# Patient Record
Sex: FEMALE | Race: OTHER | Employment: FULL TIME | URBAN - METROPOLITAN AREA
[De-identification: names, ages, dates, MRNs, and addresses within clinical notes are randomized per-mention and may not be internally consistent; named-entity substitution may affect disease eponyms.]

---

## 2023-12-28 ENCOUNTER — OFFICE VISIT (OUTPATIENT)
Dept: PRIMARY CARE CLINIC | Age: 33
End: 2023-12-28
Payer: COMMERCIAL

## 2023-12-28 VITALS
WEIGHT: 139.8 LBS | DIASTOLIC BLOOD PRESSURE: 74 MMHG | SYSTOLIC BLOOD PRESSURE: 105 MMHG | OXYGEN SATURATION: 97 % | HEART RATE: 85 BPM

## 2023-12-28 DIAGNOSIS — M62.838 TRAPEZIUS MUSCLE SPASM: Primary | ICD-10-CM

## 2023-12-28 DIAGNOSIS — R20.2 PARESTHESIA OF RIGHT ARM: ICD-10-CM

## 2023-12-28 PROCEDURE — 99203 OFFICE O/P NEW LOW 30 MIN: CPT | Performed by: FAMILY MEDICINE

## 2023-12-28 RX ORDER — METHYLPREDNISOLONE 4 MG/1
TABLET ORAL
Qty: 1 KIT | Refills: 0 | Status: SHIPPED | OUTPATIENT
Start: 2023-12-28

## 2023-12-28 RX ORDER — NORETHINDRONE ACETATE AND ETHINYL ESTRADIOL .02; 1 MG/1; MG/1
TABLET ORAL
COMMUNITY
Start: 2023-08-23

## 2023-12-28 RX ORDER — MELOXICAM 7.5 MG/1
7.5 TABLET ORAL DAILY
Qty: 30 TABLET | Refills: 0 | Status: SHIPPED | OUTPATIENT
Start: 2023-12-28

## 2023-12-28 NOTE — PROGRESS NOTES
[facility]. PRIMARY CARE  [address]  [address] [place] [id number]  Dept: [phone]  Dept Fax: [phone]     2023      Iain Morales   1990     Chief Complaint   Patient presents with    Establish Care    Arm Pain     No injury started about 2 months ago right arm        HPI    Pt comes in today for new patient visit. C/o right arm pain. Started ~ 2 months ago. Pain is mostly upper arm. Has noticed some weakness in her  strength. Arm feels heavy if she is at rest with tingling from the elbow down into her hand. She is right hand dominant. No specific injury but does  her daughter on this side who just turned 1. Pain is worse with lifting the arm up. PHQ-9 Total Score: 0 (2023  9:01 AM)       Prior to Visit Medications    Medication Sig Taking? Authorizing Provider   norethindrone-ethinyl estradiol (MICROGESTIN ) 1-20 MG-MCG per tablet Take 1 active tablet by oral route daily, takes continuously, skips placebos Yes Provider, Historical, MD        History reviewed. No pertinent past medical history. Social History     Tobacco Use    Smoking status: Never    Smokeless tobacco: Never   Substance Use Topics    Alcohol use: Yes     Comment: socially    Drug use: Never        Past Surgical History:   Procedure Laterality Date     SECTION, CLASSIC          No Known Allergies     Family History   Problem Relation Age of Onset    Thyroid Disease Mother     Unknown Father     Cancer Maternal Aunt     Hypertension Maternal Grandmother     Diabetes Maternal Grandfather         Patient's past medical history, surgical history, family history, medications, and allergies  were all reviewed and updated as appropriate today. Review of Systems   Constitutional:  Negative for fever. Respiratory:  Negative for cough. Musculoskeletal:  Negative for joint swelling, myalgias, neck pain and neck stiffness.        /74   Pulse 85   Wt

## 2024-03-06 ENCOUNTER — PATIENT MESSAGE (OUTPATIENT)
Dept: PRIMARY CARE CLINIC | Age: 34
End: 2024-03-06

## 2024-03-06 NOTE — TELEPHONE ENCOUNTER
From: Kristin Elkins  To: Dr. Leonor Smith  Sent: 3/6/2024 10:27 AM EST  Subject: Continued pain    Hi Dr. Smith, I have had continued pain that has now moved from my shoulder/upper arm to my forearm and wrist. I suspect it may be forearm tendonitis. I also a have a small lump sticking out of my wrist and hurts if i put a tiny bit of pressure on it.     I would like to get a referral to physical therapy and something than can assist with pain but would prefer it is not any type of pain killer.

## 2024-03-07 ENCOUNTER — OFFICE VISIT (OUTPATIENT)
Dept: PRIMARY CARE CLINIC | Age: 34
End: 2024-03-07
Payer: COMMERCIAL

## 2024-03-07 VITALS
DIASTOLIC BLOOD PRESSURE: 58 MMHG | SYSTOLIC BLOOD PRESSURE: 89 MMHG | WEIGHT: 139 LBS | OXYGEN SATURATION: 99 % | HEART RATE: 68 BPM | TEMPERATURE: 98.5 F

## 2024-03-07 DIAGNOSIS — R20.2 PARESTHESIA OF RIGHT ARM: ICD-10-CM

## 2024-03-07 DIAGNOSIS — M79.631 RIGHT FOREARM PAIN: Primary | ICD-10-CM

## 2024-03-07 PROCEDURE — 99213 OFFICE O/P EST LOW 20 MIN: CPT | Performed by: FAMILY MEDICINE

## 2024-03-07 SDOH — ECONOMIC STABILITY: FOOD INSECURITY: WITHIN THE PAST 12 MONTHS, THE FOOD YOU BOUGHT JUST DIDN'T LAST AND YOU DIDN'T HAVE MONEY TO GET MORE.: NEVER TRUE

## 2024-03-07 SDOH — ECONOMIC STABILITY: FOOD INSECURITY: WITHIN THE PAST 12 MONTHS, YOU WORRIED THAT YOUR FOOD WOULD RUN OUT BEFORE YOU GOT MONEY TO BUY MORE.: NEVER TRUE

## 2024-03-07 SDOH — ECONOMIC STABILITY: INCOME INSECURITY: HOW HARD IS IT FOR YOU TO PAY FOR THE VERY BASICS LIKE FOOD, HOUSING, MEDICAL CARE, AND HEATING?: NOT HARD AT ALL

## 2024-03-07 SDOH — ECONOMIC STABILITY: HOUSING INSECURITY
IN THE LAST 12 MONTHS, WAS THERE A TIME WHEN YOU DID NOT HAVE A STEADY PLACE TO SLEEP OR SLEPT IN A SHELTER (INCLUDING NOW)?: NO

## 2024-03-07 ASSESSMENT — PATIENT HEALTH QUESTIONNAIRE - PHQ9
SUM OF ALL RESPONSES TO PHQ QUESTIONS 1-9: 0
SUM OF ALL RESPONSES TO PHQ9 QUESTIONS 1 & 2: 0
2. FEELING DOWN, DEPRESSED OR HOPELESS: 0
SUM OF ALL RESPONSES TO PHQ QUESTIONS 1-9: 0
1. LITTLE INTEREST OR PLEASURE IN DOING THINGS: 0

## 2024-03-07 NOTE — PROGRESS NOTES
[facility]  [facility] PRIMARY CARE  [address] [place] [id number]  Dept: [phone]  Dept Fax: [phone]     3/7/2024      Kristin Elkins   1990     Chief Complaint   Patient presents with    Joint Pain       HPI    Pt comes in today for persistent RUE pain. Initially seen 23 for right trapezius and upper arm pain. + muscle spasm. Recommended to do meloxicam + medrol and stretching exercises. She did not  the medication. Notes now the pain has migrated to her forearm and wrist. 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 Wrist extension is particularly painful. Has also noticed small nodule to the dorsum on her wrist that is new. Feels  strength has lessened. Hard to pick things up on this side. Occasionally will have numbness/tingling to the arm and hand, not sure what fingers specifically or if all of them. No injury or trauma.     PHQ-9 Total Score: 0 (3/7/2024  8:01 AM)       Prior to Visit Medications    Medication Sig Taking? Authorizing Provider   norethindrone-ethinyl estradiol (MICROGESTIN ) 1-20 MG-MCG per tablet Take 1 active tablet by oral route daily, takes continuously, skips placebos  Provider, MD Sean   methylPREDNISolone (MEDROL DOSEPACK) 4 MG tablet Take by mouth as directed.  Leonor Smith,    meloxicam (MOBIC) 7.5 MG tablet Take 1 tablet by mouth daily  Leonor Smith, DO        No past medical history on file.     Social History     Tobacco Use    Smoking status: Never    Smokeless tobacco: Never   Substance Use Topics    Alcohol use: Yes     Comment: socially    Drug use: Never        Past Surgical History:   Procedure Laterality Date     SECTION, CLASSIC          No Known Allergies     Family History   Problem Relation Age of Onset    Thyroid Disease Mother     Unknown Father     Cancer Maternal Aunt     Hypertension Maternal Grandmother     Diabetes Maternal Grandfather

## 2024-03-19 ENCOUNTER — OFFICE VISIT (OUTPATIENT)
Dept: ORTHOPEDIC SURGERY | Age: 34
End: 2024-03-19
Payer: COMMERCIAL

## 2024-03-19 VITALS — BODY MASS INDEX: 25.58 KG/M2 | RESPIRATION RATE: 14 BRPM | HEIGHT: 62 IN | WEIGHT: 139 LBS

## 2024-03-19 DIAGNOSIS — M67.431 GANGLION CYST OF DORSUM OF RIGHT WRIST: Primary | ICD-10-CM

## 2024-03-19 PROCEDURE — 99243 OFF/OP CNSLTJ NEW/EST LOW 30: CPT | Performed by: ORTHOPAEDIC SURGERY

## 2024-03-19 NOTE — PROGRESS NOTES
This 34 y.o. year old right hand dominant  is seen in consultation for Leonor Smith DO  with a chief complaint of a mass on the right wrist.  It has been present for approximately 1 month. It's size is fluctuating.  It is  associated with intermittent pain and occasional finger paresthesias which proceeded the onset of the mass.  Feelings of stiffness and fullness are often perceived by the patient.  There is not  a history of injury or overuse. There is no history of nocturnal symptoms, fever or chills. The patient has become concerned about the mass and the worsening associated symptoms and is seen for hand surgery evaluation. The pain assessment has been reviewed and is correct as stated..    The patient's social history, past medical history, family history, medications, allergies and review of systems, entered 3/19/24, have been reviewed, and dated and are recorded in the chart.    On physical examination the patient is Height: 157.5 cm (5' 2\") tall and weighs Weight - Scale: 63 kg (139 lb).  Respirations are 18 per minute.  The patient is well nourished, is oriented to time and place, demonstrates appropriate mood and affect as well as normal gait and station.  There is a 1 1/2 cm mass present on the dorsal aspect of the right wrist.   It is cystic and nontender to touch.  It does transilluminate.  The overlying skin is normal.  Range of motion of the fingers, thumbs and wrists is full, bilaterally with mild pain on wrist extension.  Distal circulation and sensation are intact in both upper extremities.  Gross muscle strength is normal bilaterally. Deep tendon reflexes are present and symmetrical.  Hand and wrist joints are stable. There are no enlarged epitrochlear lymph nodes.    I have personally reviewed and interpreted all previous external imaging studies, laboratory tests, diagnostic procedures and medical encounters pertinent to this patient's visit today.    Impression: Dorsal